# Patient Record
Sex: MALE | Race: BLACK OR AFRICAN AMERICAN | NOT HISPANIC OR LATINO | Employment: STUDENT | URBAN - METROPOLITAN AREA
[De-identification: names, ages, dates, MRNs, and addresses within clinical notes are randomized per-mention and may not be internally consistent; named-entity substitution may affect disease eponyms.]

---
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
## 2025-02-10 ENCOUNTER — TELEPHONE (OUTPATIENT)
Dept: PRIMARY CARE | Facility: CLINIC | Age: 20
End: 2025-02-10
Payer: COMMERCIAL

## 2025-02-10 DIAGNOSIS — G40.909 NONINTRACTABLE EPILEPSY WITHOUT STATUS EPILEPTICUS, UNSPECIFIED EPILEPSY TYPE (MULTI): Primary | ICD-10-CM

## 2025-02-10 RX ORDER — LEVETIRACETAM 500 MG/1
500 TABLET ORAL 2 TIMES DAILY
Qty: 60 TABLET | Refills: 0 | Status: SHIPPED | OUTPATIENT
Start: 2025-02-10

## 2025-02-10 NOTE — TELEPHONE ENCOUNTER
08/19/2022     Alfonso Wright   [facility] [id number]     Congratulations on take the next step toward your health with [facility].  Below you will find the information regarding your upcoming visits with out healthcare team.       A fasting lab appointment has been scheduled for DATE: 8/23 at TIME: 12:30 pm.  Please arrive at [facility]: [address] [place] [address] for this appointment having nothing to eat or drink at least 12 hours (with the exception of water and medications.)     2.  You are invited to prepare for your consultation appointment by viewing the Medical Weight Management education video found at: [email or web address].  You are also encouraged to begin logging your food and beverages prior to your consultation.                3. Your Medical Weight Management consultation is scheduled with BAMBI Dominguez on DATE: 9/28 at Time: 1:00 pm at the following location: [address].  Please arrive 15 minutes early for this appointment, which will allow us to obtain a baseline body composition analysis using a special scale.         If you have any questions or would need to make changes to your appointment, please contact the Medical Weight Management Department at [phone].  Please bring the following items to your initial consultation:              [] Blue new patient paperwork             [] Green food log           Thank you,   [facility].    Dr. Resendiz Pt. [facility] Jennifer Bone left a voicemail message asking can you put in an order for Yuriy seizure medication  levetiracetam send to [facility] listed in chart if with questions Jennifer contact number is [phone].

## 2025-02-11 NOTE — TELEPHONE ENCOUNTER
Told mom he has to go back and see Dr Hickey at [facility] but I can give him one month supply